# Patient Record
Sex: MALE | Race: WHITE | NOT HISPANIC OR LATINO | URBAN - METROPOLITAN AREA
[De-identification: names, ages, dates, MRNs, and addresses within clinical notes are randomized per-mention and may not be internally consistent; named-entity substitution may affect disease eponyms.]

---

## 2023-10-13 ENCOUNTER — OFFICE VISIT (OUTPATIENT)
Dept: URBAN - METROPOLITAN AREA CLINIC 40 | Facility: CLINIC | Age: 78
End: 2023-10-13

## 2023-11-03 ENCOUNTER — LAB OUTSIDE AN ENCOUNTER (OUTPATIENT)
Dept: URBAN - METROPOLITAN AREA CLINIC 40 | Facility: CLINIC | Age: 78
End: 2023-11-03

## 2023-11-03 ENCOUNTER — OFFICE VISIT (OUTPATIENT)
Dept: URBAN - METROPOLITAN AREA CLINIC 40 | Facility: CLINIC | Age: 78
End: 2023-11-03
Payer: MEDICARE

## 2023-11-03 VITALS
BODY MASS INDEX: 34.58 KG/M2 | WEIGHT: 247 LBS | HEIGHT: 71 IN | DIASTOLIC BLOOD PRESSURE: 70 MMHG | SYSTOLIC BLOOD PRESSURE: 120 MMHG | HEART RATE: 83 BPM

## 2023-11-03 DIAGNOSIS — Z85.46 HISTORY OF PROSTATE CANCER: ICD-10-CM

## 2023-11-03 DIAGNOSIS — K63.5 HYPERPLASTIC COLONIC POLYP, UNSPECIFIED PART OF COLON: ICD-10-CM

## 2023-11-03 DIAGNOSIS — K64.8 HEMORRHOIDS, INTERNAL: ICD-10-CM

## 2023-11-03 DIAGNOSIS — K57.30 DIVERTICULA, COLON: ICD-10-CM

## 2023-11-03 PROBLEM — 428262008: Status: ACTIVE | Noted: 2023-11-03

## 2023-11-03 PROBLEM — 733657002: Status: ACTIVE | Noted: 2023-11-03

## 2023-11-03 PROCEDURE — 99202 OFFICE O/P NEW SF 15 MIN: CPT | Performed by: NURSE PRACTITIONER

## 2023-11-03 RX ORDER — AMLODIPINE BESYLATE 5 MG/1
AS DIRECTED TABLET ORAL
Status: ACTIVE | COMMUNITY
Start: 2023-11-03

## 2023-11-03 RX ORDER — IRBESARTAN 300 MG/1
AS DIRECTED TABLET, FILM COATED ORAL
Status: ACTIVE | COMMUNITY
Start: 2023-11-03

## 2023-11-03 RX ORDER — FUROSEMIDE 20 MG/1
AS DIRECTED TABLET ORAL
Status: ACTIVE | COMMUNITY
Start: 2023-11-03

## 2023-11-03 RX ORDER — ALLOPURINOL 300 MG/1
AS DIRECTED TABLET ORAL
Status: ACTIVE | COMMUNITY
Start: 2023-11-03

## 2023-11-03 RX ORDER — ROSUVASTATIN CALCIUM 10 MG/1
AS DIRECTED TABLET, COATED ORAL
Status: ACTIVE | COMMUNITY
Start: 2023-11-03

## 2023-11-03 NOTE — HPI-TODAY'S VISIT:
78-year-old male patient with past medical history as listed below. Colonoscopy March 17, 2017 with Dr. Slater, multiple small and large mouth diverticula in sigmoid and descending colon, internal hemorrhoids medium size, 3 rectal polyps removed with cold biopsy forceps. Repeat colonoscopy in 5 years, hyperplastic polyps.  -- The patient presents today to schedule colonoscopy, he is an active healthy 78, Denies any concerns, no hematochezia, dark stools, abdominal pain. He underwent TURP a couple of years ago. No issues , doing well.

## 2024-01-09 ENCOUNTER — OUT OF OFFICE VISIT (OUTPATIENT)
Dept: URBAN - METROPOLITAN AREA SURGERY CENTER 30 | Facility: SURGERY CENTER | Age: 79
End: 2024-01-09
Payer: MEDICARE

## 2024-01-09 DIAGNOSIS — Z86.010 ADENOMAS PERSONAL HISTORY OF COLONIC POLYPS: ICD-10-CM

## 2024-01-09 DIAGNOSIS — K64.8 OTHER HEMORRHOIDS: ICD-10-CM

## 2024-01-09 DIAGNOSIS — Z09 ENCOUNTER FOR FOLLOW-UP EXAMINATION AFTER COMPLETED TREATMENT FOR CONDITIONS OTHER THAN MALIGNANT NEOPLASM: ICD-10-CM

## 2024-01-09 DIAGNOSIS — K57.30 DIVERTICULA OF COLON: ICD-10-CM

## 2024-01-09 DIAGNOSIS — Z86.010 PERSONAL HISTORY OF COLONIC POLYP: ICD-10-CM

## 2024-01-09 PROCEDURE — 00811 ANES LWR INTST NDSC NOS: CPT | Performed by: NURSE ANESTHETIST, CERTIFIED REGISTERED

## 2024-01-09 PROCEDURE — G8907 PT DOC NO EVENTS ON DISCHARG: HCPCS | Performed by: CLINIC/CENTER

## 2024-01-09 PROCEDURE — G0105 COLORECTAL SCRN; HI RISK IND: HCPCS | Performed by: CLINIC/CENTER

## 2024-01-09 PROCEDURE — G0105 COLORECTAL SCRN; HI RISK IND: HCPCS | Performed by: INTERNAL MEDICINE

## 2024-02-09 ENCOUNTER — OV EP (OUTPATIENT)
Dept: URBAN - METROPOLITAN AREA CLINIC 40 | Facility: CLINIC | Age: 79
End: 2024-02-09
Payer: MEDICARE

## 2024-02-09 VITALS
DIASTOLIC BLOOD PRESSURE: 70 MMHG | HEART RATE: 73 BPM | BODY MASS INDEX: 35 KG/M2 | HEIGHT: 71 IN | SYSTOLIC BLOOD PRESSURE: 120 MMHG | WEIGHT: 250 LBS

## 2024-02-09 DIAGNOSIS — K57.30 DIVERTICULA, COLON: ICD-10-CM

## 2024-02-09 DIAGNOSIS — K64.8 HEMORRHOIDS, INTERNAL: ICD-10-CM

## 2024-02-09 DIAGNOSIS — Z85.46 HISTORY OF PROSTATE CANCER: ICD-10-CM

## 2024-02-09 DIAGNOSIS — Z86.010 PERSONAL HISTORY OF COLONIC POLYPS: ICD-10-CM

## 2024-02-09 PROBLEM — 428283002: Status: ACTIVE | Noted: 2024-02-09

## 2024-02-09 PROCEDURE — 99213 OFFICE O/P EST LOW 20 MIN: CPT | Performed by: NURSE PRACTITIONER

## 2024-02-09 RX ORDER — AMLODIPINE BESYLATE 5 MG/1
AS DIRECTED TABLET ORAL
Status: ACTIVE | COMMUNITY
Start: 2023-11-03

## 2024-02-09 RX ORDER — ROSUVASTATIN CALCIUM 10 MG/1
AS DIRECTED TABLET, COATED ORAL
Status: ACTIVE | COMMUNITY
Start: 2023-11-03

## 2024-02-09 RX ORDER — IRBESARTAN 300 MG/1
AS DIRECTED TABLET, FILM COATED ORAL
Status: ACTIVE | COMMUNITY
Start: 2023-11-03

## 2024-02-09 RX ORDER — ALLOPURINOL 300 MG/1
AS DIRECTED TABLET ORAL
Status: ACTIVE | COMMUNITY
Start: 2023-11-03

## 2024-02-09 RX ORDER — FUROSEMIDE 20 MG/1
AS DIRECTED TABLET ORAL
Status: ACTIVE | COMMUNITY
Start: 2023-11-03

## 2024-02-09 NOTE — HPI-TODAY'S VISIT:
78-year old male patient with past medical history as listed below, known to Dr. Slater.Last office visit November 3, 2023 with me for colon cancer screening.  Prior colonoscopy March 17, 2017 with Dr. Slater with hyperplastic polyps repeat in 5 years. He was an active healthy 78-year-old denied any concerns or complaints, history of TURP few years back doing well no issues. Colonoscopy was scheduled.  --January 9, 2024 colonoscopy normal digital rectal exam, multiple small and large mouth diverticula in the sigmoid and descending colon.  Small internal hemorrhoids found.  No specimens collected.  Ileocecal valve in limited views of the cecum were normal could not see behind the ileocecal valve due to body habitus.  -- The patient presents today for f/u after colonoscopy. He is doing well and without complaint and or issue. Discussed colonoscopy results. No recall mentioned, but can consider repeating colonoscopy in 5 years for history of polyps.